# Patient Record
Sex: FEMALE | URBAN - METROPOLITAN AREA
[De-identification: names, ages, dates, MRNs, and addresses within clinical notes are randomized per-mention and may not be internally consistent; named-entity substitution may affect disease eponyms.]

---

## 2018-12-06 ENCOUNTER — APPOINTMENT (RX ONLY)
Dept: URBAN - METROPOLITAN AREA CLINIC 41 | Facility: CLINIC | Age: 48
Setting detail: DERMATOLOGY
End: 2018-12-06

## 2018-12-06 DIAGNOSIS — D18.0 HEMANGIOMA: ICD-10-CM

## 2018-12-06 PROBLEM — D18.01 HEMANGIOMA OF SKIN AND SUBCUTANEOUS TISSUE: Status: ACTIVE | Noted: 2018-12-06

## 2018-12-06 PROCEDURE — 11301 SHAVE SKIN LESION 0.6-1.0 CM: CPT

## 2018-12-06 PROCEDURE — ? COUNSELING

## 2018-12-06 PROCEDURE — ? SHAVE REMOVAL

## 2018-12-06 ASSESSMENT — LOCATION DETAILED DESCRIPTION DERM: LOCATION DETAILED: LEFT PROXIMAL LATERAL POSTERIOR THIGH

## 2018-12-06 ASSESSMENT — LOCATION SIMPLE DESCRIPTION DERM: LOCATION SIMPLE: LEFT POSTERIOR THIGH

## 2018-12-06 ASSESSMENT — LOCATION ZONE DERM: LOCATION ZONE: LEG

## 2018-12-06 NOTE — PROCEDURE: SHAVE REMOVAL
X Size Of Lesion In Cm (Optional): 0
Bill For Surgical Tray: no
Lab: [id number]
Notification Instructions: Patient will be notified of results of pathology within three weeks
Post-Care Instructions: Keep lesion covered and dried for the next 24 hours. Then wash with warm soapy water and keep ointment on the lesion (Vasaline or Polysporin) to avoid scabbing and crusting
Was A Bandage Applied: Yes
Hemostasis: Electrocautery
Biopsy Method: double edge Personna blade
Anesthesia Volume In Cc: 0.5
Detail Level: Detailed
Consent: Verbal consent was obtained. Risks were reviewed with patient including but not limited to scarring, bleeding, infection, scabbing, incomplete removal, and recurrence
Size Of Lesion In Cm (Required): 0.6
Medical Necessity Information: It is in your best interest to select a reason for this procedure from the list below. All of these items fulfill various CMS LCD requirements except the new and changing color options.
Medical Necessity Clause: The following procedure was medically necessary due to:Traumatized
Anesthesia Type: 1% lidocaine with epinephrine
Billing Type: Patient Bill
Wound Care: Vaseline

## 2018-12-06 NOTE — HPI: SKIN LESION
Is This A New Presentation, Or A Follow-Up?: Skin Lesion
What Type Of Note Output Would You Prefer (Optional)?: Standard Output
How Severe Is Your Skin Lesion?: moderate
Has Your Skin Lesion Been Treated?: not been treated
Additional History: Patient would like spot removed today.